# Patient Record
Sex: FEMALE | Race: BLACK OR AFRICAN AMERICAN | NOT HISPANIC OR LATINO
[De-identification: names, ages, dates, MRNs, and addresses within clinical notes are randomized per-mention and may not be internally consistent; named-entity substitution may affect disease eponyms.]

---

## 2018-10-23 ENCOUNTER — MEDICATION RENEWAL (OUTPATIENT)
Age: 48
End: 2018-10-23

## 2018-10-23 PROBLEM — Z00.00 ENCOUNTER FOR PREVENTIVE HEALTH EXAMINATION: Status: ACTIVE | Noted: 2018-10-23

## 2019-01-23 ENCOUNTER — APPOINTMENT (OUTPATIENT)
Dept: CARDIOLOGY | Facility: CLINIC | Age: 49
End: 2019-01-23
Payer: COMMERCIAL

## 2019-01-23 ENCOUNTER — NON-APPOINTMENT (OUTPATIENT)
Age: 49
End: 2019-01-23

## 2019-01-23 VITALS
HEART RATE: 79 BPM | DIASTOLIC BLOOD PRESSURE: 92 MMHG | SYSTOLIC BLOOD PRESSURE: 145 MMHG | OXYGEN SATURATION: 97 % | WEIGHT: 183 LBS | RESPIRATION RATE: 12 BRPM

## 2019-01-23 DIAGNOSIS — Z87.2 PERSONAL HISTORY OF DISEASES OF THE SKIN AND SUBCUTANEOUS TISSUE: ICD-10-CM

## 2019-01-23 DIAGNOSIS — Z86.39 PERSONAL HISTORY OF OTHER ENDOCRINE, NUTRITIONAL AND METABOLIC DISEASE: ICD-10-CM

## 2019-01-23 DIAGNOSIS — Z83.3 FAMILY HISTORY OF DIABETES MELLITUS: ICD-10-CM

## 2019-01-23 DIAGNOSIS — Z78.9 OTHER SPECIFIED HEALTH STATUS: ICD-10-CM

## 2019-01-23 PROCEDURE — 99214 OFFICE O/P EST MOD 30 MIN: CPT

## 2019-01-23 PROCEDURE — 93000 ELECTROCARDIOGRAM COMPLETE: CPT

## 2019-01-23 NOTE — ASSESSMENT
[FreeTextEntry1] : 49 yo female with recurrent paroxysmal supraventricular tachycardia (1st episode at [facility] ~ 4 years ago, 2nd episode at [facility] ~ 3 years ago, and 3rd episode on 8/29/17 at [place]). Echo in Sept 2017 was unremarkable for significant abnormalities.\par \par Patient with 2 brief episodes of palpitations ~5-6 months ago, which only lasted for 5 minutes before spontaneously resolving. Will continue to monitor clinically for now.\par Should patient have recurrence of documented SVT despite current beta blocker therapy, will need to consider ablation procedure.\par \par Patient's blood pressure is elevated today. Will start amlodipine 5 mg po daily for BP control.\par Regular exercise, weight loss, and low salt diet were encouraged.\par \par RTC in 3 months for follow-up

## 2019-01-23 NOTE — PHYSICAL EXAM
[General Appearance - Well Developed] : well developed [General Appearance - Well Nourished] : well nourished [General Appearance - In No Acute Distress] : no acute distress [Normal Conjunctiva] : the conjunctiva exhibited no abnormalities [] : no respiratory distress [Respiration, Rhythm And Depth] : normal respiratory rhythm and effort [Auscultation Breath Sounds / Voice Sounds] : lungs were clear to auscultation bilaterally [Normal Rate] : normal [Rhythm Regular] : regular [Normal S1] : normal S1 [Normal S2] : normal S2 [No Murmur] : no murmurs heard [2+] : left 2+ [No Pitting Edema] : no pitting edema present [Bowel Sounds] : normal bowel sounds [Abnormal Walk] : normal gait [Nail Clubbing] : no clubbing of the fingernails [Cyanosis, Localized] : no localized cyanosis [Oriented To Time, Place, And Person] : oriented to person, place, and time [Affect] : the affect was normal [Mood] : the mood was normal [FreeTextEntry1] : No JVD present [S3] : no S3 [S4] : no S4 [Right Carotid Bruit] : no bruit heard over the right carotid [Left Carotid Bruit] : no bruit heard over the left carotid

## 2019-01-23 NOTE — HISTORY OF PRESENT ILLNESS
[FreeTextEntry1] : 49 yo female with recurrent paroxysmal supraventricular tachycardia (1st episode at [facility] ~ 4 years ago, 2nd episode at [facility] ~ 3 years ago, and 3rd episode on 8/29/17 at [place]). Patient presents today for follow-up. She reports 2 episodes of brief palpitations 5-6 months ago, which only lasted for 5 minutes before spontaneously resolving. Patient denies chest pain, dyspnea, syncope, edema, melena, hematochezia, or hematemesis.\par

## 2019-01-23 NOTE — REVIEW OF SYSTEMS
[Palpitations] : palpitations [Negative] : Heme/Lymph [Shortness Of Breath] : no shortness of breath [Dyspnea on exertion] : not dyspnea during exertion [Chest Pain] : no chest pain [Lower Ext Edema] : no extremity edema [Leg Claudication] : no intermittent leg claudication

## 2019-04-18 ENCOUNTER — RX RENEWAL (OUTPATIENT)
Age: 49
End: 2019-04-18

## 2019-08-16 ENCOUNTER — NON-APPOINTMENT (OUTPATIENT)
Age: 49
End: 2019-08-16

## 2019-08-16 ENCOUNTER — APPOINTMENT (OUTPATIENT)
Dept: CARDIOLOGY | Facility: CLINIC | Age: 49
End: 2019-08-16
Payer: COMMERCIAL

## 2019-08-16 VITALS
OXYGEN SATURATION: 100 % | HEIGHT: 61 IN | RESPIRATION RATE: 12 BRPM | HEART RATE: 79 BPM | DIASTOLIC BLOOD PRESSURE: 94 MMHG | SYSTOLIC BLOOD PRESSURE: 132 MMHG | WEIGHT: 183 LBS | BODY MASS INDEX: 34.55 KG/M2

## 2019-08-16 PROCEDURE — 99213 OFFICE O/P EST LOW 20 MIN: CPT

## 2019-08-16 PROCEDURE — 93000 ELECTROCARDIOGRAM COMPLETE: CPT

## 2019-08-16 NOTE — REVIEW OF SYSTEMS
[Negative] : Heme/Lymph [Shortness Of Breath] : no shortness of breath [Chest Pain] : no chest pain [Dyspnea on exertion] : not dyspnea during exertion [Leg Claudication] : no intermittent leg claudication [Lower Ext Edema] : no extremity edema [Palpitations] : no palpitations

## 2019-08-16 NOTE — REASON FOR VISIT
[Follow-Up - Clinic] : a clinic follow-up of [Supraventricular Tachycardia] : supraventricular tachycardia [Hypertension] : hypertension

## 2019-08-16 NOTE — ASSESSMENT
[FreeTextEntry1] : 48 yo female with hypertension and recurrent paroxysmal supraventricular tachycardia (1st episode at [facility] ~ 4 years ago, 2nd episode at [facility] ~ 3 years ago, and 3rd episode on 8/29/17 at [place]). Echo in Sept 2017 was unremarkable for significant abnormalities.\par \par Patient is clinically stable and denies any recent episodes of palpitations/SVT.\par Will continue to monitor on metoprolol succinate 25 mg po daily.\par Should patient have recurrence of documented SVT despite current beta blocker therapy, will consider ablation procedure.\par \par Blood pressure is well controlled on current regimen of amlodipine 5 mg po daily and metoprolol succinate 25 mg po daily.\par Will continue ot monitor on current regimen.\par Regular exercise, weight loss, and low salt diet were encouraged.\par \par RTC in 1 year

## 2019-08-16 NOTE — PHYSICAL EXAM
[General Appearance - Well Nourished] : well nourished [General Appearance - Well Developed] : well developed [General Appearance - In No Acute Distress] : no acute distress [Normal Conjunctiva] : the conjunctiva exhibited no abnormalities [] : no respiratory distress [Respiration, Rhythm And Depth] : normal respiratory rhythm and effort [Auscultation Breath Sounds / Voice Sounds] : lungs were clear to auscultation bilaterally [Bowel Sounds] : normal bowel sounds [Abnormal Walk] : normal gait [Nail Clubbing] : no clubbing of the fingernails [Cyanosis, Localized] : no localized cyanosis [Affect] : the affect was normal [Oriented To Time, Place, And Person] : oriented to person, place, and time [Mood] : the mood was normal [Normal Rate] : normal [Rhythm Regular] : regular [Normal S1] : normal S1 [Normal S2] : normal S2 [No Murmur] : no murmurs heard [2+] : left 2+ [No Pitting Edema] : no pitting edema present [FreeTextEntry1] : NCAT [S4] : no S4 [S3] : no S3 [Left Carotid Bruit] : no bruit heard over the left carotid [Right Carotid Bruit] : no bruit heard over the right carotid

## 2019-08-16 NOTE — HISTORY OF PRESENT ILLNESS
[FreeTextEntry1] : 48 yo female with hypertension and recurrent paroxysmal supraventricular tachycardia (1st episode at [facility] ~ 4 years ago, 2nd episode at [facility] ~ 3 years ago, and 3rd episode on 8/29/17 at [place]). Patient presents today for follow-up. She is doing well and denies any recurrence of palpitations/SVT since her last visit in Jan 2019. Patient denies chest pain, dyspnea, syncope, edema, melena, hematochezia, or hematemesis. She walks 1/2 mile without complaints.\par \par PMD: Dr. Steve Rutherford ([facility])

## 2020-01-19 ENCOUNTER — RX RENEWAL (OUTPATIENT)
Age: 50
End: 2020-01-19

## 2020-06-08 ENCOUNTER — RX RENEWAL (OUTPATIENT)
Age: 50
End: 2020-06-08

## 2020-11-10 ENCOUNTER — APPOINTMENT (OUTPATIENT)
Dept: CARDIOLOGY | Facility: CLINIC | Age: 50
End: 2020-11-10
Payer: COMMERCIAL

## 2020-11-10 ENCOUNTER — NON-APPOINTMENT (OUTPATIENT)
Age: 50
End: 2020-11-10

## 2020-11-10 VITALS
OXYGEN SATURATION: 100 % | RESPIRATION RATE: 12 BRPM | HEART RATE: 74 BPM | DIASTOLIC BLOOD PRESSURE: 80 MMHG | BODY MASS INDEX: 34.01 KG/M2 | TEMPERATURE: 97 F | WEIGHT: 180 LBS | SYSTOLIC BLOOD PRESSURE: 122 MMHG

## 2020-11-10 PROCEDURE — 93000 ELECTROCARDIOGRAM COMPLETE: CPT

## 2020-11-10 PROCEDURE — 99072 ADDL SUPL MATRL&STAF TM PHE: CPT

## 2020-11-10 PROCEDURE — 99213 OFFICE O/P EST LOW 20 MIN: CPT

## 2020-11-10 NOTE — HISTORY OF PRESENT ILLNESS
[FreeTextEntry1] : 49 yo female with hypertension and recurrent paroxysmal supraventricular tachycardia (1st episode at [facility] ~ 4 years ago, 2nd episode at [facility] ~ 3 years ago, and 3rd episode on 8/29/17 at [place]). Patient presents today for follow-up. She is doing well and denies any recurrence of palpitations/SVT. Patient denies chest pain, dyspnea, syncope, edema, melena, hematochezia, or hematemesis. She has been mowing her 1 acre of lawn every other week without complaints.\par \par PMD: Dr. Steve uRtherford ([facility])

## 2020-11-10 NOTE — ASSESSMENT
[FreeTextEntry1] : 49 yo female with hypertension and recurrent paroxysmal supraventricular tachycardia (1st episode at [facility] ~ 4 years ago, 2nd episode at [facility] ~ 3 years ago, and 3rd episode on 8/29/17 at [place]). Echo in Sept 2017 was unremarkable for significant abnormalities. ECG today demonstrates normal sinus rhythm.\par \par Patient is clinically stable and denies any recent episodes of palpitations/SVT.\par Will continue to monitor on metoprolol succinate 25 mg po daily.\par Should patient have recurrence of documented SVT despite current beta blocker therapy, will consider ablation procedure.\par \par Blood pressure is well controlled on amlodipine 5 mg po daily and metoprolol succinate 25 mg po daily.\par Will continue ot monitor on current regimen.

## 2020-11-10 NOTE — PHYSICAL EXAM
[General Appearance - Well Developed] : well developed [General Appearance - Well Nourished] : well nourished [General Appearance - In No Acute Distress] : no acute distress [Normal Conjunctiva] : the conjunctiva exhibited no abnormalities [] : no respiratory distress [Respiration, Rhythm And Depth] : normal respiratory rhythm and effort [Auscultation Breath Sounds / Voice Sounds] : lungs were clear to auscultation bilaterally [Nail Clubbing] : no clubbing of the fingernails [Cyanosis, Localized] : no localized cyanosis [Oriented To Time, Place, And Person] : oriented to person, place, and time [Affect] : the affect was normal [Mood] : the mood was normal [Normal Rate] : normal [Rhythm Regular] : regular [Normal S1] : normal S1 [Normal S2] : normal S2 [No Murmur] : no murmurs heard [2+] : left 2+ [No Pitting Edema] : no pitting edema present [FreeTextEntry1] : No JVD present [S3] : no S3 [S4] : no S4 [Right Carotid Bruit] : no bruit heard over the right carotid [Left Carotid Bruit] : no bruit heard over the left carotid

## 2020-11-10 NOTE — REVIEW OF SYSTEMS
[Negative] : Heme/Lymph [Shortness Of Breath] : no shortness of breath [Dyspnea on exertion] : not dyspnea during exertion [Chest Pain] : no chest pain [Lower Ext Edema] : no extremity edema [Leg Claudication] : no intermittent leg claudication [Palpitations] : no palpitations

## 2021-03-01 ENCOUNTER — RX RENEWAL (OUTPATIENT)
Age: 51
End: 2021-03-01

## 2021-08-27 ENCOUNTER — RX RENEWAL (OUTPATIENT)
Age: 51
End: 2021-08-27

## 2022-08-01 ENCOUNTER — RX RENEWAL (OUTPATIENT)
Age: 52
End: 2022-08-01

## 2022-10-10 ENCOUNTER — APPOINTMENT (OUTPATIENT)
Dept: CARDIOLOGY | Facility: CLINIC | Age: 52
End: 2022-10-10

## 2022-10-31 ENCOUNTER — NON-APPOINTMENT (OUTPATIENT)
Age: 52
End: 2022-10-31

## 2022-10-31 ENCOUNTER — APPOINTMENT (OUTPATIENT)
Dept: CARDIOLOGY | Facility: CLINIC | Age: 52
End: 2022-10-31

## 2022-10-31 VITALS
RESPIRATION RATE: 12 BRPM | OXYGEN SATURATION: 100 % | HEART RATE: 78 BPM | SYSTOLIC BLOOD PRESSURE: 142 MMHG | WEIGHT: 180 LBS | DIASTOLIC BLOOD PRESSURE: 88 MMHG | HEIGHT: 61 IN | BODY MASS INDEX: 33.99 KG/M2

## 2022-10-31 PROCEDURE — 93000 ELECTROCARDIOGRAM COMPLETE: CPT

## 2022-10-31 PROCEDURE — 99214 OFFICE O/P EST MOD 30 MIN: CPT | Mod: 25

## 2022-10-31 NOTE — HISTORY OF PRESENT ILLNESS
[FreeTextEntry1] : 53 yo female with hypertension and recurrent paroxysmal supraventricular tachycardia (most recent episode on 8/29/17 at [place]). Patient presents today for follow-up. She is doing well and denies any recurrence of palpitations/SVT. Patient denies chest pain, dyspnea, syncope, edema, melena, hematochezia, or hematemesis. \par \par PMD: Dr. Steve Rutherford ([facility])

## 2022-10-31 NOTE — CARDIOLOGY SUMMARY
[de-identified] : \par 10/31/22 ECG: Sinus, rate 70 bpm\par 11/10/20 ECG: Sinus, rate 74 bpm\par 8/16/19 ECG: Sinus, rate 72 bpm\par  [de-identified] : \par 9/19/17 Echo: Normal LV size and systolic/diastolic function, LVEF 66%. Mild MR/TR. Normal PASP (28 mmHg). LA volume index 32 ml/m2.

## 2022-10-31 NOTE — ASSESSMENT
[FreeTextEntry1] : 51 yo female with hypertension and recurrent paroxysmal supraventricular tachycardia (most recent episode on 8/29/17 at [place]). \par Echo in Sept 2017 was unremarkable for significant abnormalities. \par ECG today demonstrates normal sinus rhythm.\par \par Patient is clinically stable and denies any recent episodes of palpitations/SVT.\par Will continue to monitor on metoprolol succinate 25 mg po daily.\par Should patient have recurrence of documented SVT despite current beta blocker therapy, will consider ablation procedure.\par \par Blood pressure is adequately controlled on amlodipine 5 mg po daily and metoprolol succinate 25 mg po daily.\par Will continue to monitor on current regimen.\par Patient was instructed to monitor BP over the next week and keep a log of BP readings in AM, mid-day, and evening. Patient to call if BP readings are persistently elevated.

## 2023-01-19 ENCOUNTER — RX RENEWAL (OUTPATIENT)
Age: 53
End: 2023-01-19

## 2023-10-23 ENCOUNTER — NON-APPOINTMENT (OUTPATIENT)
Age: 53
End: 2023-10-23

## 2023-10-23 ENCOUNTER — APPOINTMENT (OUTPATIENT)
Dept: CARDIOLOGY | Facility: CLINIC | Age: 53
End: 2023-10-23
Payer: COMMERCIAL

## 2023-10-23 VITALS
OXYGEN SATURATION: 100 % | HEART RATE: 92 BPM | DIASTOLIC BLOOD PRESSURE: 82 MMHG | HEIGHT: 61 IN | BODY MASS INDEX: 34.93 KG/M2 | SYSTOLIC BLOOD PRESSURE: 129 MMHG | WEIGHT: 185 LBS

## 2023-10-23 DIAGNOSIS — I47.10 SUPRAVENTRICULAR TACHYCARDIA, UNSPECIFIED: ICD-10-CM

## 2023-10-23 DIAGNOSIS — I10 ESSENTIAL (PRIMARY) HYPERTENSION: ICD-10-CM

## 2023-10-23 PROCEDURE — 93000 ELECTROCARDIOGRAM COMPLETE: CPT

## 2023-10-23 PROCEDURE — 99214 OFFICE O/P EST MOD 30 MIN: CPT | Mod: 25

## 2024-03-25 RX ORDER — AMLODIPINE BESYLATE 5 MG/1
5 TABLET ORAL
Qty: 90 | Refills: 2 | Status: ACTIVE | COMMUNITY
Start: 2019-01-23

## 2024-03-25 RX ORDER — METOPROLOL SUCCINATE 25 MG/1
25 TABLET, EXTENDED RELEASE ORAL
Qty: 90 | Refills: 3 | Status: ACTIVE | COMMUNITY
Start: 2018-10-23

## 2024-10-31 ENCOUNTER — APPOINTMENT (OUTPATIENT)
Dept: CARDIOLOGY | Facility: CLINIC | Age: 54
End: 2024-10-31
Payer: COMMERCIAL

## 2024-10-31 ENCOUNTER — NON-APPOINTMENT (OUTPATIENT)
Age: 54
End: 2024-10-31

## 2024-10-31 VITALS
BODY MASS INDEX: 34.36 KG/M2 | HEIGHT: 61 IN | RESPIRATION RATE: 14 BRPM | SYSTOLIC BLOOD PRESSURE: 139 MMHG | OXYGEN SATURATION: 100 % | WEIGHT: 182 LBS | DIASTOLIC BLOOD PRESSURE: 87 MMHG | HEART RATE: 79 BPM

## 2024-10-31 DIAGNOSIS — I47.10 SUPRAVENTRICULAR TACHYCARDIA, UNSPECIFIED: ICD-10-CM

## 2024-10-31 DIAGNOSIS — I10 ESSENTIAL (PRIMARY) HYPERTENSION: ICD-10-CM

## 2024-10-31 PROCEDURE — 93000 ELECTROCARDIOGRAM COMPLETE: CPT

## 2024-10-31 PROCEDURE — 99214 OFFICE O/P EST MOD 30 MIN: CPT | Mod: 25

## 2025-05-31 ENCOUNTER — NON-APPOINTMENT (OUTPATIENT)
Age: 55
End: 2025-05-31